# Patient Record
Sex: MALE | Race: WHITE | URBAN - METROPOLITAN AREA
[De-identification: names, ages, dates, MRNs, and addresses within clinical notes are randomized per-mention and may not be internally consistent; named-entity substitution may affect disease eponyms.]

---

## 2018-09-19 ENCOUNTER — OFFICE VISIT (OUTPATIENT)
Dept: URBAN - METROPOLITAN AREA CLINIC 17 | Facility: CLINIC | Age: 83
End: 2018-09-19
Payer: COMMERCIAL

## 2018-09-19 PROCEDURE — 99213 OFFICE O/P EST LOW 20 MIN: CPT | Performed by: OPHTHALMOLOGY

## 2018-09-19 ASSESSMENT — INTRAOCULAR PRESSURE
OS: 16
OD: 16

## 2018-09-19 NOTE — IMPRESSION/PLAN
Impression: Diagnosis: Acute angle-closure glaucoma, bilateral. Code: M91.054.
 - LPI OU-
S/P ALT OD 6/12/17-
S/P ALT OS 7/10/17 - IOP lowered effectively with glaucoma meds- ONH stable ou - Plan: Discussed diagnosis, explained and understood by patient. Discussed IOP/ONH/Glaucoma management and risks. Continue Lumigan QHS OU, and Timolol QAM OU. 
discussed combo surgery CEIOL OD with Trabeculectomy in the future. Will continue to monitor pressure.

## 2018-09-19 NOTE — IMPRESSION/PLAN
Impression: Age-related nuclear cataract, bilateral: H25.13. OU. Vision: vision affected. Plan: Cataracts account for the patient's vision complaints. Discussed diagnosis in detail with patient. Discussed treatment options with patient. Recommend cataract consultation with Brian Butler when patient desires.

## 2019-01-21 ENCOUNTER — OFFICE VISIT (OUTPATIENT)
Dept: URBAN - METROPOLITAN AREA CLINIC 17 | Facility: CLINIC | Age: 84
End: 2019-01-21
Payer: COMMERCIAL

## 2019-01-21 PROCEDURE — 92014 COMPRE OPH EXAM EST PT 1/>: CPT | Performed by: OPHTHALMOLOGY

## 2019-01-21 ASSESSMENT — INTRAOCULAR PRESSURE
OS: 16
OD: 19

## 2019-01-21 NOTE — IMPRESSION/PLAN
Impression: Age-related nuclear cataract, bilateral: H25.13. OU. Vision: vision affected. Plan: Cataracts account for the patient's vision complaints. Discussed diagnosis in detail with patient. Discussed treatment options with patient. Recommend cataract consultation with Migue De La O when patient desires. Patient would like more information on cataract surgery and will determine with Dr. Migue De La O if he would like to go forth with surgery.

## 2019-01-21 NOTE — IMPRESSION/PLAN
Impression: Diagnosis: Acute angle-closure glaucoma, bilateral. Code: I25.769.
 - LPI OU-
S/P ALT OD 6/12/17-
S/P ALT OS 7/10/17 - IOP lowered effectively with glaucoma meds- ONH stable ou - Plan: Discussed diagnosis, explained and understood by patient. Discussed IOP/ONH/Glaucoma management and risks. Continue Lumigan QHS OU, and Timolol QAM OU. 
discussed combo surgery CEIOL OD with Trabeculectomy in the future. Will continue to monitor pressure.

## 2019-01-25 ENCOUNTER — OFFICE VISIT (OUTPATIENT)
Dept: URBAN - METROPOLITAN AREA CLINIC 17 | Facility: CLINIC | Age: 84
End: 2019-01-25
Payer: COMMERCIAL

## 2019-01-25 PROCEDURE — 99213 OFFICE O/P EST LOW 20 MIN: CPT | Performed by: OPHTHALMOLOGY

## 2019-01-25 ASSESSMENT — INTRAOCULAR PRESSURE
OS: 15
OD: 12

## 2019-01-25 ASSESSMENT — VISUAL ACUITY
OD: 20/400
OS: 20/25

## 2019-01-25 NOTE — IMPRESSION/PLAN
Impression: Age-related nuclear cataract, bilateral: H25.13. Condition: established, worsening. Symptoms: could improve with surgery. Vision: vision worse. Plan: Discussed diagnosis in detail with patient today. 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 Discussed possible improvements in vision and IOP if we did surgery. Pt would like to defer surgery at this time. Will cont to monitor and will advised us when he is having more difficultly with vision and would like to consider CEIOL.

## 2019-01-25 NOTE — IMPRESSION/PLAN
Impression: Diagnosis: Acute angle-closure glaucoma, bilateral. Code: J67.480.
 - LPI OU-
S/P ALT OD 6/12/17-
S/P ALT OS 7/10/17 - IOP lowered effectively with glaucoma meds- ONH stable ou - Plan: Continue Lumigan QHS OU, and Timolol QAM OU. Will continue to monitor pressure.

## 2019-05-06 ENCOUNTER — OFFICE VISIT (OUTPATIENT)
Dept: URBAN - METROPOLITAN AREA CLINIC 17 | Facility: CLINIC | Age: 84
End: 2019-05-06
Payer: COMMERCIAL

## 2019-05-06 PROCEDURE — 99213 OFFICE O/P EST LOW 20 MIN: CPT | Performed by: OPHTHALMOLOGY

## 2019-05-06 ASSESSMENT — INTRAOCULAR PRESSURE
OS: 14
OD: 15

## 2019-05-06 NOTE — IMPRESSION/PLAN
Impression: Diagnosis: Acute angle-closure glaucoma, bilateral. Code: E95.068.
 - LPI OU-
S/P ALT OD 6/12/17-
S/P ALT OS 7/10/17 - IOP lowered effectively with glaucoma meds- ONH stable ou - Plan: Discussed diagnosis, explained and understood by patient. Discussed IOP/ONH/Glaucoma management and risks. Continue Lumigan QHS OU, and Timolol QAM OU. Will continue to monitor condition and symptoms.

## 2019-09-18 ENCOUNTER — OFFICE VISIT (OUTPATIENT)
Dept: URBAN - METROPOLITAN AREA CLINIC 17 | Facility: CLINIC | Age: 84
End: 2019-09-18
Payer: COMMERCIAL

## 2019-09-18 PROCEDURE — 92083 EXTENDED VISUAL FIELD XM: CPT | Performed by: OPHTHALMOLOGY

## 2019-09-18 PROCEDURE — 99214 OFFICE O/P EST MOD 30 MIN: CPT | Performed by: OPHTHALMOLOGY

## 2019-09-18 PROCEDURE — 92133 CPTRZD OPH DX IMG PST SGM ON: CPT | Performed by: OPHTHALMOLOGY

## 2019-09-18 ASSESSMENT — INTRAOCULAR PRESSURE
OS: 14
OD: 14

## 2019-09-18 NOTE — IMPRESSION/PLAN
Impression: Diagnosis: Acute angle-closure glaucoma, bilateral. Code: L03.711.
 - LPI OU- S/P ALT OD 6/12/17- S/P ALT OS 7/10/17
 - IOP doing well with glaucoma meds- ONH stable ou - Plan: Discussed diagnosis, IOP/ONH/Glaucoma management and risks. OCT and visual field ordered and reviewed with patient. Continue Lumigan QHS OU, and Timolol QAM OU. Will continue to monitor condition and symptoms.

## 2020-02-24 ENCOUNTER — OFFICE VISIT (OUTPATIENT)
Dept: URBAN - METROPOLITAN AREA CLINIC 17 | Facility: CLINIC | Age: 85
End: 2020-02-24
Payer: COMMERCIAL

## 2020-02-24 PROCEDURE — 99213 OFFICE O/P EST LOW 20 MIN: CPT | Performed by: OPHTHALMOLOGY

## 2020-02-24 ASSESSMENT — INTRAOCULAR PRESSURE
OS: 16
OD: 16

## 2020-02-24 NOTE — IMPRESSION/PLAN
Impression: Acute angle-closure glaucoma, bilateral. Code: N66.982. LPI OU ALT OD 6/12/17 ALT OS 7/10/17 IOP/ONH stable OU Plan: Discussed diagnosis, IOP/ONH/Glaucoma management and risks. Continue Lumigan QHS OU, and Timolol QAM OU. Will continue to monitor condition and symptoms.

## 2020-09-16 ENCOUNTER — OFFICE VISIT (OUTPATIENT)
Dept: URBAN - METROPOLITAN AREA CLINIC 17 | Facility: CLINIC | Age: 85
End: 2020-09-16
Payer: COMMERCIAL

## 2020-09-16 PROCEDURE — 99213 OFFICE O/P EST LOW 20 MIN: CPT | Performed by: OPHTHALMOLOGY

## 2020-09-16 ASSESSMENT — INTRAOCULAR PRESSURE
OS: 13
OD: 14

## 2020-09-16 NOTE — IMPRESSION/PLAN
Impression: Acute angle-closure glaucoma, bilateral. Code: B73.267. LPI OU, ALT OD 6/12/17, ALT OS 7/10/17 IOP/ONH stable OU Plan: Discussed diagnosis, explained and understood by patient. Discussed IOP/ONH/Glaucoma management and risks. Continue Lumigan QHS OU, and Timolol QAM OU. Will continue to monitor condition and symptoms.

## 2020-09-16 NOTE — IMPRESSION/PLAN
Impression: Age-related nuclear cataract, bilateral: H25.13. Condition: established, worsening. Symptoms: could improve with surgery. Vision: vision worse. Plan: Discussed diagnosis in detail with patient today. 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 advised pt  cataract surgery has a great success rate. IF and when pt is ready he can set up an appt with Dr Abiodun Falcon to discuss further. Advised pt to consider trying cataract surgery on lazy eye so that he understands process of surgery and may be less scared.

## 2020-12-04 ENCOUNTER — OFFICE VISIT (OUTPATIENT)
Dept: URBAN - METROPOLITAN AREA CLINIC 17 | Facility: CLINIC | Age: 85
End: 2020-12-04
Payer: MEDICARE

## 2020-12-04 DIAGNOSIS — H53.041 AMBLYOPIA SUSPECT, RIGHT EYE: ICD-10-CM

## 2020-12-04 DIAGNOSIS — H25.13 AGE-RELATED NUCLEAR CATARACT, BILATERAL: Primary | ICD-10-CM

## 2020-12-04 PROCEDURE — 99214 OFFICE O/P EST MOD 30 MIN: CPT | Performed by: OPHTHALMOLOGY

## 2020-12-04 ASSESSMENT — VISUAL ACUITY: OS: 20/60

## 2020-12-04 ASSESSMENT — INTRAOCULAR PRESSURE
OD: 15
OS: 12

## 2020-12-04 ASSESSMENT — KERATOMETRY
OD: 44.00
OS: 44.25

## 2020-12-04 NOTE — IMPRESSION/PLAN
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 Plan: Discussed diagnosis in detail with patient. Per patient OD  has never been as good as OS since childhood.

## 2020-12-04 NOTE — IMPRESSION/PLAN
Impression: Age-related nuclear cataract, bilateral: H25.13. Condition: established, worsening. Symptoms: could improve with surgery. Plan: Cataract accounts for patient's complaints. Reviewed risks, benefits, and procedure. Patient desires surgery, schedule ce/iol OS, RL2, discussed lens options, distance refractive target, patient is clear for surgery in [facility]. no surgery recommended OD as it is not believed to improve vision.

## 2020-12-16 ENCOUNTER — PRE-OPERATIVE VISIT (OUTPATIENT)
Dept: URBAN - METROPOLITAN AREA CLINIC 17 | Facility: CLINIC | Age: 85
End: 2020-12-16
Payer: COMMERCIAL

## 2020-12-16 ASSESSMENT — PACHYMETRY
OS: 2.41
OS: 23.03
OD: 22.73
OD: 2.41

## 2020-12-31 ENCOUNTER — POST-OPERATIVE VISIT (OUTPATIENT)
Dept: URBAN - METROPOLITAN AREA CLINIC 17 | Facility: CLINIC | Age: 85
End: 2020-12-31
Payer: MEDICARE

## 2020-12-31 ENCOUNTER — SURGERY (OUTPATIENT)
Dept: URBAN - METROPOLITAN AREA SURGERY 7 | Facility: SURGERY | Age: 85
End: 2020-12-31
Payer: COMMERCIAL

## 2020-12-31 PROCEDURE — 99024 POSTOP FOLLOW-UP VISIT: CPT | Performed by: OPTOMETRIST

## 2020-12-31 PROCEDURE — 66984 XCAPSL CTRC RMVL W/O ECP: CPT | Performed by: OPHTHALMOLOGY

## 2020-12-31 ASSESSMENT — INTRAOCULAR PRESSURE
OD: 17
OD: 17
OS: 18
OS: 18

## 2020-12-31 NOTE — IMPRESSION/PLAN
Impression: S/P Cataract Extraction by phacoemulsification with IOL placement 02055 OS - . Encounter for surgical aftercare following surgery on a sense organ  Z48.810.  Excellent post op course Plan: --Taper Pred-Moxi-Nepaf QID x 1 wk, TID x 1 wk, BID x 1wk, QD x 1wk, then d/c

## 2021-01-29 ENCOUNTER — POST-OPERATIVE VISIT (OUTPATIENT)
Dept: URBAN - METROPOLITAN AREA CLINIC 17 | Facility: CLINIC | Age: 86
End: 2021-01-29
Payer: MEDICARE

## 2021-01-29 DIAGNOSIS — Z48.810 ENCOUNTER FOR SURGICAL AFTERCARE FOLLOWING SURGERY ON A SENSE ORGAN: Primary | ICD-10-CM

## 2021-01-29 PROCEDURE — 99024 POSTOP FOLLOW-UP VISIT: CPT | Performed by: OPTOMETRIST

## 2021-01-29 ASSESSMENT — INTRAOCULAR PRESSURE
OS: 14
OD: 14

## 2021-01-29 ASSESSMENT — VISUAL ACUITY: OS: 20/40+1

## 2021-01-29 NOTE — IMPRESSION/PLAN
Impression: S/P CE/Standard IOL SA60WF+ 20.00 OS - 29 Days. Encounter for surgical aftercare following surgery on a sense organ  Z48.810. Condition is improving - Patient wishes to have an eval with Dr. Mehran Galaviz to discuss having OD done. Patient aware [address] may not improve, although would like to have it done to improve colors and peripheral vision.  Plan: N/a OD CAT Eval with Dr. Mehran Galaviz 
--Discontinue Pred-Moxi-Nepaf
--Continue Lumigan QHS OU and Timolol Qam OU

## 2021-02-03 ENCOUNTER — OFFICE VISIT (OUTPATIENT)
Dept: URBAN - METROPOLITAN AREA CLINIC 17 | Facility: CLINIC | Age: 86
End: 2021-02-03
Payer: MEDICARE

## 2021-02-03 PROCEDURE — 99214 OFFICE O/P EST MOD 30 MIN: CPT | Performed by: OPHTHALMOLOGY

## 2021-02-03 PROCEDURE — 99024 POSTOP FOLLOW-UP VISIT: CPT | Performed by: OPHTHALMOLOGY

## 2021-02-03 PROCEDURE — 92133 CPTRZD OPH DX IMG PST SGM ON: CPT | Performed by: OPHTHALMOLOGY

## 2021-02-03 PROCEDURE — 92083 EXTENDED VISUAL FIELD XM: CPT | Performed by: OPHTHALMOLOGY

## 2021-02-03 ASSESSMENT — INTRAOCULAR PRESSURE
OS: 13
OD: 18

## 2021-02-03 NOTE — IMPRESSION/PLAN
Impression: Acute angle-closure glaucoma, bilateral. Code: S47.111. LPI OU, ALT OD 6/12/17, ALT OS 7/10/17 IOP/ONH stable OU Plan: Discussed diagnosis, explained and understood by patient. Discussed IOP/ONH/Glaucoma management and risks. VF and OCT ordered and reviewed today. Continue Lumigan QHS OU, and Timolol QAM OU. Will continue to monitor condition and symptoms.

## 2021-02-04 ENCOUNTER — POST-OPERATIVE VISIT (OUTPATIENT)
Dept: URBAN - METROPOLITAN AREA CLINIC 17 | Facility: CLINIC | Age: 86
End: 2021-02-04
Payer: MEDICARE

## 2021-02-04 DIAGNOSIS — H25.11 AGE-RELATED NUCLEAR CATARACT, RIGHT EYE: Primary | ICD-10-CM

## 2021-02-04 PROCEDURE — 99024 POSTOP FOLLOW-UP VISIT: CPT | Performed by: OPHTHALMOLOGY

## 2021-02-04 PROCEDURE — 99214 OFFICE O/P EST MOD 30 MIN: CPT | Performed by: OPHTHALMOLOGY

## 2021-02-04 ASSESSMENT — INTRAOCULAR PRESSURE
OD: 20
OS: 11

## 2021-02-04 ASSESSMENT — VISUAL ACUITY: OD: 20/400

## 2021-02-04 NOTE — IMPRESSION/PLAN
Impression: Age-related nuclear cataract, right eye: H25.11. Condition: established, worsening. Plan: Cataract accounts for patient's complaints. Reviewed risks, benefits, and procedure. Patient desires surgery, schedule ce/iol OD, RL2, standard lens, distance refractive target, patient is clear for surgery in [place] [id number]. Pt is aware vision would be limited but would expect colors to be brighter and some improvement in Peripheral vision.

## 2021-03-25 ENCOUNTER — SURGERY (OUTPATIENT)
Dept: URBAN - METROPOLITAN AREA SURGERY 7 | Facility: SURGERY | Age: 86
End: 2021-03-25
Payer: MEDICARE

## 2021-03-25 PROCEDURE — 66984 XCAPSL CTRC RMVL W/O ECP: CPT | Performed by: OPHTHALMOLOGY

## 2021-03-26 ENCOUNTER — POST-OPERATIVE VISIT (OUTPATIENT)
Dept: URBAN - METROPOLITAN AREA CLINIC 17 | Facility: CLINIC | Age: 86
End: 2021-03-26
Payer: MEDICARE

## 2021-03-26 DIAGNOSIS — Z96.1 PRESENCE OF INTRAOCULAR LENS: Primary | ICD-10-CM

## 2021-03-26 PROCEDURE — 99024 POSTOP FOLLOW-UP VISIT: CPT | Performed by: OPTOMETRIST

## 2021-03-26 ASSESSMENT — INTRAOCULAR PRESSURE
OS: 9
OD: 20

## 2021-03-26 NOTE — IMPRESSION/PLAN
Impression: S/P Cataract Extraction by phacoemulsification with IOL placement 19601 OD - 1 Day. Presence of intraocular lens  Z96.1. Post operative instructions reviewed - Plan: RTC in 1 week for PO2 OD:
--Taper Pred-Moxi-Nepaf QID x 1 wk, TID x 1 wk, BID x 1wk, QD x 1wk, then d/c

## 2021-04-01 ENCOUNTER — POST-OPERATIVE VISIT (OUTPATIENT)
Dept: URBAN - METROPOLITAN AREA CLINIC 17 | Facility: CLINIC | Age: 86
End: 2021-04-01
Payer: MEDICARE

## 2021-04-01 PROCEDURE — 99024 POSTOP FOLLOW-UP VISIT: CPT | Performed by: OPTOMETRIST

## 2021-04-01 ASSESSMENT — INTRAOCULAR PRESSURE
OS: 12
OD: 15

## 2021-04-01 ASSESSMENT — VISUAL ACUITY: OD: 20/400

## 2021-04-01 NOTE — IMPRESSION/PLAN
Impression: S/P CE/Standard IOL SA60WF+ 24.50 OD - 7 Days. Presence of intraocular lens  Z96.1. Plan: 3 WK PO3 --Taper Pred-Moxi-Nepaf TID x 1 wk, BID x 1wk, QD x 1wk, then d/c
--Continue all meds Timolol QAM OU Lumigan OHS OU

## 2021-05-03 ENCOUNTER — POST-OPERATIVE VISIT (OUTPATIENT)
Dept: URBAN - METROPOLITAN AREA CLINIC 17 | Facility: CLINIC | Age: 86
End: 2021-05-03
Payer: MEDICARE

## 2021-05-03 PROCEDURE — 99024 POSTOP FOLLOW-UP VISIT: CPT | Performed by: OPTOMETRIST

## 2021-05-03 ASSESSMENT — VISUAL ACUITY
OD: 20/400
OS: 20/25-1

## 2021-05-03 ASSESSMENT — INTRAOCULAR PRESSURE
OD: 16
OS: 12

## 2021-05-03 NOTE — IMPRESSION/PLAN
Impression: S/P CE/Standard IOL SA60WF+ 24.50 OD - 39 Days. Presence of intraocular lens  Z96.1. Condition is improving - Plan: Refraction PRN Keep appt with Dr. Estiven Cortés QHS OU and Timolol QAM OU

## 2021-06-23 ENCOUNTER — OFFICE VISIT (OUTPATIENT)
Dept: URBAN - METROPOLITAN AREA CLINIC 17 | Facility: CLINIC | Age: 86
End: 2021-06-23
Payer: MEDICARE

## 2021-06-23 DIAGNOSIS — H40.213 ACUTE ANGLE-CLOSURE GLAUCOMA, BILATERAL: Primary | ICD-10-CM

## 2021-06-23 PROCEDURE — 99213 OFFICE O/P EST LOW 20 MIN: CPT | Performed by: OPHTHALMOLOGY

## 2021-06-23 ASSESSMENT — INTRAOCULAR PRESSURE
OS: 15
OD: 15

## 2021-06-23 NOTE — IMPRESSION/PLAN
Impression: Acute angle-closure glaucoma, bilateral. Code: I25.337. LPI OU, ALT OD 6/12/17, ALT OS 7/10/17 IOP/ONH stable OU Plan: Discussed diagnosis, explained and understood by patient. Discussed IOP/ONH/Glaucoma management and risks. Continue Lumigan QHS OU, and Timolol QAM OU. Will continue to monitor condition and symptoms.

## 2021-11-01 ENCOUNTER — OFFICE VISIT (OUTPATIENT)
Dept: URBAN - METROPOLITAN AREA CLINIC 17 | Facility: CLINIC | Age: 86
End: 2021-11-01
Payer: MEDICARE

## 2021-11-01 PROCEDURE — 99213 OFFICE O/P EST LOW 20 MIN: CPT | Performed by: OPHTHALMOLOGY

## 2021-11-01 ASSESSMENT — INTRAOCULAR PRESSURE
OS: 12
OD: 13

## 2021-11-01 NOTE — IMPRESSION/PLAN
Impression: Acute angle-closure glaucoma, bilateral. Code: C46.707. LPI OU 9/8/09 ALT OD 6/12/17 ALT OS 7/10/17 IOP/ONH stable OU Plan: Discussed diagnosis, explained and understood by patient. Discussed IOP/ONH/Glaucoma management and risks. Continue Lumigan QHS OU and Timolol QAM OU. Will continue to monitor condition and symptoms.

## 2022-04-04 ENCOUNTER — OFFICE VISIT (OUTPATIENT)
Dept: URBAN - METROPOLITAN AREA CLINIC 17 | Facility: CLINIC | Age: 87
End: 2022-04-04
Payer: MEDICARE

## 2022-04-04 PROCEDURE — 99213 OFFICE O/P EST LOW 20 MIN: CPT | Performed by: OPHTHALMOLOGY

## 2022-04-04 PROCEDURE — 92083 EXTENDED VISUAL FIELD XM: CPT | Performed by: OPHTHALMOLOGY

## 2022-04-04 PROCEDURE — 92133 CPTRZD OPH DX IMG PST SGM ON: CPT | Performed by: OPHTHALMOLOGY

## 2022-04-04 ASSESSMENT — INTRAOCULAR PRESSURE
OD: 18
OS: 15

## 2022-04-04 NOTE — IMPRESSION/PLAN
Impression: Acute angle-closure glaucoma, bilateral. Code: P17.005. LPI OU 9/8/09 ALT OD 6/12/17 ALT OS 7/10/17 IOP/ONH borderline OD, stable OS Plan: Discussed diagnosis, explained and understood by patient. Discussed IOP/ONH/Glaucoma management and risks. Continue Lumigan QHS OU and Timolol QAM OU. Will continue to monitor condition and symptoms.

## 2022-08-08 ENCOUNTER — OFFICE VISIT (OUTPATIENT)
Dept: URBAN - METROPOLITAN AREA CLINIC 17 | Facility: CLINIC | Age: 87
End: 2022-08-08
Payer: MEDICARE

## 2022-08-08 DIAGNOSIS — H40.213 ACUTE ANGLE-CLOSURE GLAUCOMA, BILATERAL: Primary | ICD-10-CM

## 2022-08-08 PROCEDURE — 99213 OFFICE O/P EST LOW 20 MIN: CPT | Performed by: OPHTHALMOLOGY

## 2022-08-08 ASSESSMENT — INTRAOCULAR PRESSURE
OD: 11
OS: 9

## 2022-08-08 NOTE — IMPRESSION/PLAN
Impression: Acute angle-closure glaucoma, bilateral. Code: Q62.987. LPI OU 9/8/09 ALT OD 6/12/17 ALT OS 7/10/17 IOP/ONH stable OU Plan: Discussed diagnosis, explained and understood by patient. Discussed IOP/ONH/Glaucoma management and risks. Continue Lumigan QHS OU and Timolol QAM OU. Will continue to monitor condition and symptoms.

## 2022-12-12 ENCOUNTER — OFFICE VISIT (OUTPATIENT)
Dept: URBAN - METROPOLITAN AREA CLINIC 17 | Facility: CLINIC | Age: 87
End: 2022-12-12
Payer: MEDICARE

## 2022-12-12 DIAGNOSIS — H40.213 ACUTE ANGLE-CLOSURE GLAUCOMA, BILATERAL: Primary | ICD-10-CM

## 2022-12-12 PROCEDURE — 99213 OFFICE O/P EST LOW 20 MIN: CPT | Performed by: OPHTHALMOLOGY

## 2022-12-12 RX ORDER — TIMOLOL MALEATE 5 MG/ML
0.5 % SOLUTION/ DROPS OPHTHALMIC
Qty: 5 | Refills: 11 | Status: ACTIVE
Start: 2022-12-12

## 2022-12-12 RX ORDER — BIMATOPROST 0.1 MG/ML
0.01 % SOLUTION/ DROPS OPHTHALMIC
Qty: 2.5 | Refills: 11 | Status: ACTIVE
Start: 2022-12-12

## 2022-12-12 ASSESSMENT — INTRAOCULAR PRESSURE
OS: 11
OD: 12

## 2022-12-12 NOTE — IMPRESSION/PLAN
Impression: Acute angle-closure glaucoma, bilateral. Code: S10.261. IOPs stable OU w/current tx 
LPI OU 9/8/09 ALT OD 6/12/17 ALT OS 7/10/17 Plan: Discussed diagnosis, explained and understood by patient. Discussed IOP/ONH/Glaucoma management and risks. Continue Lumigan QHS OU and Timolol QAM OU (ERX'd w/refills). Will continue to monitor condition and symptoms.

## 2023-04-14 ENCOUNTER — TESTING ONLY (OUTPATIENT)
Dept: URBAN - METROPOLITAN AREA CLINIC 17 | Facility: CLINIC | Age: 88
End: 2023-04-14
Payer: MEDICARE

## 2023-04-14 DIAGNOSIS — H40.213 ACUTE ANGLE-CLOSURE GLAUCOMA, BILATERAL: Primary | ICD-10-CM

## 2023-04-17 ENCOUNTER — OFFICE VISIT (OUTPATIENT)
Dept: URBAN - METROPOLITAN AREA CLINIC 17 | Facility: CLINIC | Age: 88
End: 2023-04-17
Payer: MEDICARE

## 2023-04-17 DIAGNOSIS — H40.213 ACUTE ANGLE-CLOSURE GLAUCOMA, BILATERAL: Primary | ICD-10-CM

## 2023-04-17 PROCEDURE — 99214 OFFICE O/P EST MOD 30 MIN: CPT | Performed by: OPHTHALMOLOGY

## 2023-04-17 PROCEDURE — 92133 CPTRZD OPH DX IMG PST SGM ON: CPT | Performed by: OPHTHALMOLOGY

## 2023-04-17 ASSESSMENT — INTRAOCULAR PRESSURE
OS: 13
OD: 14

## 2023-04-17 NOTE — IMPRESSION/PLAN
Impression: Acute angle-closure glaucoma, bilateral. Code: H56.903. IOPs stable OU w/current tx 
LPI OU 9/8/09 ALT OD 6/12/17 ALT OS 7/10/17 Plan: Discussed diagnosis, explained and understood by patient. Discussed IOP/ONH/Glaucoma management and risks. Continue Lumigan QHS OU and Timolol QAM OU. Will continue to monitor condition and symptoms.

## 2023-08-16 ENCOUNTER — OFFICE VISIT (OUTPATIENT)
Dept: URBAN - METROPOLITAN AREA CLINIC 17 | Facility: CLINIC | Age: 88
End: 2023-08-16
Payer: COMMERCIAL

## 2023-08-16 DIAGNOSIS — H40.213 ACUTE ANGLE-CLOSURE GLAUCOMA, BILATERAL: Primary | ICD-10-CM

## 2023-08-16 PROCEDURE — 99213 OFFICE O/P EST LOW 20 MIN: CPT | Performed by: OPHTHALMOLOGY

## 2023-08-16 ASSESSMENT — INTRAOCULAR PRESSURE
OD: 13
OS: 13

## 2024-02-28 ENCOUNTER — OFFICE VISIT (OUTPATIENT)
Dept: URBAN - METROPOLITAN AREA CLINIC 17 | Facility: CLINIC | Age: 89
End: 2024-02-28
Payer: COMMERCIAL

## 2024-02-28 DIAGNOSIS — H40.213 ACUTE ANGLE-CLOSURE GLAUCOMA, BILATERAL: Primary | ICD-10-CM

## 2024-02-28 PROCEDURE — 92083 EXTENDED VISUAL FIELD XM: CPT | Performed by: OPHTHALMOLOGY

## 2024-02-28 PROCEDURE — 92133 CPTRZD OPH DX IMG PST SGM ON: CPT | Performed by: OPHTHALMOLOGY

## 2024-02-28 PROCEDURE — 99214 OFFICE O/P EST MOD 30 MIN: CPT | Performed by: OPHTHALMOLOGY

## 2024-02-28 ASSESSMENT — INTRAOCULAR PRESSURE
OS: 15
OD: 16

## 2024-03-18 ENCOUNTER — SURGERY (OUTPATIENT)
Dept: URBAN - METROPOLITAN AREA SURGERY 7 | Facility: SURGERY | Age: 89
End: 2024-03-18
Payer: COMMERCIAL

## 2024-03-18 PROCEDURE — 65855 TRABECULOPLASTY LASER SURG: CPT | Performed by: OPHTHALMOLOGY

## 2024-04-01 ENCOUNTER — SURGERY (OUTPATIENT)
Dept: URBAN - METROPOLITAN AREA SURGERY 7 | Facility: SURGERY | Age: 89
End: 2024-04-01
Payer: COMMERCIAL

## 2024-04-01 PROCEDURE — 65855 TRABECULOPLASTY LASER SURG: CPT | Performed by: OPHTHALMOLOGY

## 2024-05-22 ENCOUNTER — OFFICE VISIT (OUTPATIENT)
Dept: URBAN - METROPOLITAN AREA CLINIC 17 | Facility: CLINIC | Age: 89
End: 2024-05-22
Payer: COMMERCIAL

## 2024-05-22 DIAGNOSIS — H40.213 ACUTE ANGLE-CLOSURE GLAUCOMA, BILATERAL: Primary | ICD-10-CM

## 2024-05-22 PROCEDURE — 99213 OFFICE O/P EST LOW 20 MIN: CPT | Performed by: OPHTHALMOLOGY

## 2024-05-22 ASSESSMENT — INTRAOCULAR PRESSURE
OS: 12
OD: 13

## 2024-11-06 ENCOUNTER — OFFICE VISIT (OUTPATIENT)
Dept: URBAN - METROPOLITAN AREA CLINIC 17 | Facility: CLINIC | Age: 89
End: 2024-11-06
Payer: COMMERCIAL

## 2024-11-06 DIAGNOSIS — H40.213 ACUTE ANGLE-CLOSURE GLAUCOMA, BILATERAL: Primary | ICD-10-CM

## 2024-11-06 PROCEDURE — 99213 OFFICE O/P EST LOW 20 MIN: CPT | Performed by: OPHTHALMOLOGY

## 2024-11-06 ASSESSMENT — INTRAOCULAR PRESSURE
OD: 12
OS: 12

## 2025-04-21 NOTE — IMPRESSION/PLAN
Impression: Acute angle-closure glaucoma, bilateral. Code: X68.621. LPI OU, ALT OD 6/12/17, ALT OS 7/10/17 IOP/ONH stable OU Plan: Discussed diagnosis, explained and understood by patient. Discussed IOP/ONH/Glaucoma management and risks. Continue Lumigan QHS OU, and Timolol QAM OU. Will continue to monitor condition and symptoms. Order was discontinued on 2/24/2025